# Patient Record
Sex: FEMALE | Race: WHITE | NOT HISPANIC OR LATINO | Employment: FULL TIME | URBAN - METROPOLITAN AREA
[De-identification: names, ages, dates, MRNs, and addresses within clinical notes are randomized per-mention and may not be internally consistent; named-entity substitution may affect disease eponyms.]

---

## 2017-06-20 ENCOUNTER — OFFICE VISIT (OUTPATIENT)
Dept: FAMILY MEDICINE | Facility: CLINIC | Age: 37
End: 2017-06-20
Payer: COMMERCIAL

## 2017-06-20 VITALS
HEIGHT: 65 IN | BODY MASS INDEX: 34.49 KG/M2 | WEIGHT: 207 LBS | SYSTOLIC BLOOD PRESSURE: 118 MMHG | DIASTOLIC BLOOD PRESSURE: 74 MMHG | HEART RATE: 84 BPM | TEMPERATURE: 98.7 F

## 2017-06-20 DIAGNOSIS — E66.9 NON MORBID OBESITY, UNSPECIFIED OBESITY TYPE: ICD-10-CM

## 2017-06-20 DIAGNOSIS — G47.10 EXCESSIVE SLEEPINESS: ICD-10-CM

## 2017-06-20 DIAGNOSIS — Z12.4 SCREENING FOR MALIGNANT NEOPLASM OF CERVIX: ICD-10-CM

## 2017-06-20 DIAGNOSIS — Z00.01 ENCOUNTER FOR ROUTINE ADULT MEDICAL EXAM WITH ABNORMAL FINDINGS: Primary | ICD-10-CM

## 2017-06-20 DIAGNOSIS — R10.9 ABDOMINAL DISCOMFORT: ICD-10-CM

## 2017-06-20 LAB
CHOLEST SERPL-MCNC: 131 MG/DL
DEPRECATED CALCIDIOL+CALCIFEROL SERPL-MC: 22 UG/L (ref 20–75)
ERYTHROCYTE [DISTWIDTH] IN BLOOD BY AUTOMATED COUNT: 12.6 % (ref 10–15)
GLUCOSE SERPL-MCNC: 87 MG/DL (ref 70–99)
HCT VFR BLD AUTO: 41.8 % (ref 35–47)
HDLC SERPL-MCNC: 42 MG/DL
HGB BLD-MCNC: 14.1 G/DL (ref 11.7–15.7)
LDLC SERPL CALC-MCNC: 73 MG/DL
MCH RBC QN AUTO: 28.7 PG (ref 26.5–33)
MCHC RBC AUTO-ENTMCNC: 33.7 G/DL (ref 31.5–36.5)
MCV RBC AUTO: 85 FL (ref 78–100)
NONHDLC SERPL-MCNC: 89 MG/DL
PLATELET # BLD AUTO: 296 10E9/L (ref 150–450)
RBC # BLD AUTO: 4.91 10E12/L (ref 3.8–5.2)
TRIGL SERPL-MCNC: 80 MG/DL
TSH SERPL DL<=0.005 MIU/L-ACNC: 1.67 MU/L (ref 0.4–4)
WBC # BLD AUTO: 10.7 10E9/L (ref 4–11)

## 2017-06-20 PROCEDURE — G0476 HPV COMBO ASSAY CA SCREEN: HCPCS | Performed by: PHYSICIAN ASSISTANT

## 2017-06-20 PROCEDURE — 36415 COLL VENOUS BLD VENIPUNCTURE: CPT | Performed by: PHYSICIAN ASSISTANT

## 2017-06-20 PROCEDURE — 82306 VITAMIN D 25 HYDROXY: CPT | Performed by: PHYSICIAN ASSISTANT

## 2017-06-20 PROCEDURE — 82947 ASSAY GLUCOSE BLOOD QUANT: CPT | Performed by: PHYSICIAN ASSISTANT

## 2017-06-20 PROCEDURE — G0145 SCR C/V CYTO,THINLAYER,RESCR: HCPCS | Performed by: PHYSICIAN ASSISTANT

## 2017-06-20 PROCEDURE — 87624 HPV HI-RISK TYP POOLED RSLT: CPT | Performed by: PHYSICIAN ASSISTANT

## 2017-06-20 PROCEDURE — 99213 OFFICE O/P EST LOW 20 MIN: CPT | Mod: 25 | Performed by: PHYSICIAN ASSISTANT

## 2017-06-20 PROCEDURE — 85027 COMPLETE CBC AUTOMATED: CPT | Performed by: PHYSICIAN ASSISTANT

## 2017-06-20 PROCEDURE — 84443 ASSAY THYROID STIM HORMONE: CPT | Performed by: PHYSICIAN ASSISTANT

## 2017-06-20 PROCEDURE — 80061 LIPID PANEL: CPT | Performed by: PHYSICIAN ASSISTANT

## 2017-06-20 PROCEDURE — 99395 PREV VISIT EST AGE 18-39: CPT | Performed by: PHYSICIAN ASSISTANT

## 2017-06-20 NOTE — PROGRESS NOTES
SUBJECTIVE:     CC: Uma Mattson is an 36 year old woman who presents for preventive health visit.     Healthy Habits:    Do you get at least three servings of calcium containing foods daily (dairy, green leafy vegetables, etc.)? yes    Amount of exercise or daily activities, outside of work: 4-5 day(s) per week    Problems taking medications regularly not applicable    Medication side effects: No    Have you had an eye exam in the past two years? yes    Do you see a dentist twice per year? no    Do you have sleep apnea, excessive snoring or daytime drowsiness?yes, tired    Sleepiness-wakes at 6 am, but feels tired again at 640 am.  Has been working to lose weight.  No change in her weight after 3 months.  Sleeps all night, doesn't snore.  Doesn't take naps during the day.  Is a . Also having a general feeling of fatigue.    Discomfort in lower abdomen, gas. 1-2 years of this.  Loud noises.  R sided pain and discomfort deep inside. Can be across entire abdomen, but mostly on the R.   History of colonoscopy for blood in stool.  Still having thin bowel movements.  Normal colonoscopy.  Can pinpoint any triggers, food, etc.  Periods are normal, no vaginal symptoms. No urinary problems. No heartburn.    -------------------------------------    Today's PHQ-2 Score:   PHQ-2 ( 1999 Pf[employer]) 6/20/2017 4/14/2015   Q1: Little interest or pleasure in doing things 0 0   Q2: Feeling down, depressed or hopeless 0 0   PHQ-2 Score 0 0       Abuse: Current or Past(Physical, Sexual or Emotional)- No  Do you feel safe in your environment - Yes    Social History   Substance Use Topics     Smoking status: Never Smoker     Smokeless tobacco: Never Used     Alcohol use Yes      Comment: a glass or two of wine a month     The patient does not drink >3 drinks per day nor >7 drinks per week.    No results for input(s): CHOL, HDL, LDL, TRIG, CHOLHDLRATIO, NHDL in the last 53587 hours.    Reviewed orders with patient.   "Reviewed health maintenance and updated orders accordingly - Yes    Mammo Decision Support:  Mammogram not appropriate for this patient based on age.    Pertinent mammograms are reviewed under the imaging tab.  History of abnormal Pap smear: NO - age 30-65 PAP every 5 years with negative HPV co-testing recommended    Reviewed and updated as needed this visit by clinical staff  Tobacco  Allergies  Med Hx  Surg Hx  Fam Hx  Soc Hx        Reviewed and updated as needed this visit by Provider            ROS:  C: NEGATIVE for fever, chills, change in weight  I: NEGATIVE for worrisome rashes, moles or lesions  E: NEGATIVE for vision changes or irritation  ENT: NEGATIVE for ear, mouth and throat problems  R: NEGATIVE for significant cough or SOB  B: NEGATIVE for masses, tenderness or discharge  CV: NEGATIVE for chest pain, palpitations or peripheral edema  GI: as above  : NEGATIVE for unusual urinary or vaginal symptoms. Periods are regular.  M: NEGATIVE for significant arthralgias or myalgia  N: NEGATIVE for weakness, dizziness or paresthesias  P: NEGATIVE for changes in mood or affect    Problem list, Medication list, Allergies, and Medical/Social/Surgical histories reviewed in [facility] and updated as appropriate.  OBJECTIVE:     /74 (Cuff Size: Adult Large)  Pulse 84  Temp 98.7  F (37.1  C) (Oral)  Ht 5' 5\" (1.651 m)  Wt 207 lb (93.9 kg)  LMP 06/01/2017 (Approximate)  BMI 34.45 kg/m2  EXAM:  GENERAL: alert, no distress and over weight  EYES: Eyes grossly normal to inspection, PERRL and conjunctivae and sclerae normal  HENT: ear canals and TM's normal, nose and mouth without ulcers or lesions  NECK: no adenopathy, no asymmetry, masses, or scars and thyroid normal to palpation  RESP: lungs clear to auscultation - no rales, rhonchi or wheezes  BREAST: normal without masses, tenderness or nipple discharge and no palpable axillary masses or adenopathy  CV: regular rate and rhythm, normal S1 S2, no S3 or S4, no " murmur, click or rub, no peripheral edema and peripheral pulses strong  ABDOMEN: soft, mild R, and central lower tenderness, without hepatosplenomegaly or masses, and bowel sounds normal, no rebound or guarding.   (female): normal female external genitalia, normal urethral meatus, vaginal mucosa pink, moist, well rugated, and normal cervix/adnexa/uterus without masses or discharge  MS: no gross musculoskeletal defects noted, no edema  SKIN: no suspicious lesions or rashes  NEURO: Normal strength and tone, mentation intact and speech normal  PSYCH: mentation appears normal, affect normal/bright    ASSESSMENT/PLAN:     1. Encounter for routine adult medical exam with abnormal findings  Follow up pending above results. Encouraged healthy diet and regular exercise, monthy SBE, and yearly exams  - Lipid panel reflex to direct LDL  - Glucose    2. Screening for malignant neoplasm of cervix  Has been several years since last pap.  - Pap imaged thin layer screen with HPV - recommended age 30 - 65 years (select HPV order below)  - HPV High Risk Types DNA Cervical    3. Excessive sleepiness  Unclear cause, low concern for TJ but still will consider this in differential.  Below labs in further evaluation.  Recommended continued healthy eating, activity, weight loss.  - TSH with free T4 reflex  - CBC with platelets  - Vitamin D Deficiency    4. Abdominal discomfort  Ongoing for several years, mild, intermittent  Associated gas and bloating.  Has had negative colonoscopy with exception of internal hemorrhoids.  Recommended trial of FODMAP diet.  Referral to GI should this not improve.  - CBC with platelets    5. Non morbid obesity, unspecified obesity type  Recommended healthy diet and lifestyle changes.    COUNSELING:   Reviewed preventive health counseling, as reflected in patient instructions       Regular exercise       Healthy diet/nutrition         reports that she has never smoked. She has never used smokeless  "tobacco.    Estimated body mass index is 34.45 kg/(m^2) as calculated from the following:    Height as of this encounter: 5' 5\" (1.651 m).    Weight as of this encounter: 207 lb (93.9 kg).   Weight management plan: Discussed healthy diet and exercise guidelines and patient will follow up in 12 months in clinic to re-evaluate.    Counseling Resources:  ATP IV Guidelines  Pooled Cohorts Equation Calculator  Breast Cancer Risk Calculator  FRAX Risk Assessment  ICSI Preventive Guidelines  Dietary Guidelines for Americans, 2010  USDA's MyPlate  ASA Prophylaxis  Lung CA Screening    Cande Barker PA-C  [facility]  "

## 2017-06-20 NOTE — NURSING NOTE
"Chief Complaint   Patient presents with     Physical       Initial /74 (Cuff Size: Adult Large)  Pulse 84  Temp 98.7  F (37.1  C) (Oral)  Ht 5' 5\" (1.651 m)  Wt 207 lb (93.9 kg)  LMP 06/01/2017 (Approximate)  BMI 34.45 kg/m2 Estimated body mass index is 34.45 kg/(m^2) as calculated from the following:    Height as of this encounter: 5' 5\" (1.651 m).    Weight as of this encounter: 207 lb (93.9 kg).  Medication Reconciliation: complete   Marly Bustamante, Certified Medical Assistant (AAMA)     "

## 2017-06-20 NOTE — MR AVS SNAPSHOT
After Visit Summary   6/20/2017    Uma Mattson    MRN: [id number]           Patient Information     Date Of Birth          1980        Visit Information        Provider Department      6/20/2017 7:20 AM Cande Barker PA-C [facility]        Today's Diagnoses     Encounter for routine adult medical exam with abnormal findings    -  1    Screening for malignant neoplasm of cervix        Excessive sleepiness        Abdominal discomfort          Care Instructions    Cande or her team will be in touch with you with results of today's labs.  Trial of FODMAP diet. If helps, great! If doesn't, we can have you see Gastroenterology  Call with any questions or concerns.  Continue to focus on a healthy diet and regular exercise.    CELENA Abdalla PA-C    Preventive Health Recommendations  Female Ages 26 - 39  Yearly exam:   See your health care provider every year in order to    Review health changes.     Discuss preventive care.      Review your medicines if you your doctor has prescribed any.    Until age 30: Get a Pap test every three years (more often if you have had an abnormal result).    After age 30: Talk to your doctor about whether you should have a Pap test every 3 years or have a Pap test with HPV screening every 5 years.   You do not need a Pap test if your uterus was removed (hysterectomy) and you have not had cancer.  You should be tested each year for STDs (sexually transmitted diseases), if you're at risk.   Talk to your provider about how often to have your cholesterol checked.  If you are at risk for diabetes, you should have a diabetes test (fasting glucose).  Shots: Get a flu shot each year. Get a tetanus shot every 10 years.   Nutrition:     Eat at least 5 servings of fruits and vegetables each day.    Eat whole-grain bread, whole-wheat pasta and brown rice instead of white grains and rice.    Talk to your provider about Calcium and Vitamin D.      Lifestyle    Exercise at least 150 minutes a week (30 minutes a day, 5 days of the week). This will help you control your weight and prevent disease.    Limit alcohol to one drink per day.    No smoking.     Wear sunscreen to prevent skin cancer.    See your dentist every six months for an exam and cleaning.      Follow up pending above results. Encouraged healthy diet and regular exercise, monthy SBE, and yearly exams      Irritable Bowel Syndrome (IBS) is a very common problem. About 1 in 6 Americans have it, and it is more common in women.  People who have IBS get stomach pain, feel bloated, and have changes in their bowel habits. This can be constipation, diarrhea, or phases of either one.  There is nothing life-threatening or dangerous about IBS, but it can be uncomfortable and difficult to get rid of.  The cause of IBS is not clear, which makes it even harder to treat.    There are a few different things that doctors and patients have tried in order to treat IBS.   If the IBS symptoms are linked to emotions like stress and anxiety, then the symptoms might get better if the emotions are resolved.  Another thing the doctor might try is prescribing antibiotics, like rifaximin.  If the main problem is constipation, then eating more fiber might help.      Some doctors in [place], and now in the [place], are recommending a diet called the  Low FODMAP  diet.  It means a diet low in  Fermentable Oligosaccharides, Disaccharides, Monosaccharides, And Polyols.   These are different types of sugars that are found in food, which the body does not absorb very well.  When they reach the large intestine, the bacteria that live there can ferment them and make gas.  Since this gas can leave people feeling bloated and gassy, cutting out these sugars might make IBS patients feel better.  Also, these sugars can pull water into the gut and cause diarrhea.  Why FODMAP foods cause these symptoms in people with IBS, but not in  everyone, is unclear.    In 2011, there was a study comparing a low FODMAP diet to standard diet advice for patients with IBS. The people on the low FODMAP diet had less bloating, diarrhea, gas, and stomach pain.  They did have to avoid a lot of different foods, though.  Cutting out foods with those FODMAP sugars means you can t have apples, watermelon, high fructose corn syrup, soft cheese, garlic, onions, artificial sweeteners, large portions of wheat-products, or a lot of other foods.      If you are experiencing symptoms of IBS, you may want to try a low FODMAP diet.  It might seem very restricted, but there are still a lot of foods that you can eat.  There are a few good sources online to help get you started, but the FODMAP diet is still new; not every food has been analyzed to measure the FODMAP sugars.  Try cutting out some of the foods known to have a lot of FODMAP sugars, and keep a diary of you symptoms to see if they change.  A few lifestyle changes could spare you a lot of stomach upset and bowel issues.        Here is a list of food to AVOID if you follow the low FODMAP diet:  Fruit: apple, clarissa, pear, canned fruit in fruit juice, watermelon, large servings of fruit, dried, fruit, fruit juices, apple, persimmon, apricot, avocado, blackberry, cherry, lychee, nectarine, peach, pear, plum, prune  Sweeteners: fructose, high fructose corn syrup, honey, sorbitol, mannitol, xylitol, isomalt, maltritol  Milk: milk from cows, goals, sheep, custard, ice cream, yogurt.  Cheeses: soft unripened cheeses (examples-- cottage cheese, cream cheese, ricotta, mascarpone)  Vegetables: artichoke, asparagus, beetroot, broccoli, brussel spouts, cabbage, eggplant, fennel, garlic, tiffany, okra, onion, shallots, spring onion, cauliflower, bell pepper, mushroom, sweet corn  Cereals: wheat and rye in large amounts (crackers, bread, cookies, couscous, pasta)  Legumes: baked beans, chickpeas, kidney beans, lentils, soybeans.    Here  is a list of foods that you CAN eat on a low FODMAP diet:   Fruit: banana, blueberry, boysenberry, cantaloupe, cranberry, grape, grapefruit, honeydew melon, kiwi, lemon, lime, oranges, passion fruit, raspberry, rhubarb, strawberry, tangelo.  Vegetables: alfalfa, bamboo shoots, bean sprouts, bok oumar, carrot, celery, endive, willi, green beans, lettuce, olives, parsnip, potato, pumpkin, red bell pepper, silver beet, spinach, squash, sweet potato, taro, tomato, turnip, yam, zucchini.   Herbs: basil, chili, coriander, willi, lemongrass, marjoram, mint, oregano, parsely, rosemary, thyme  Cereals: gluten free bread of cereal products  Bread: 100% spelt bread  Rice  Oats  Polenta  Other: arrowroot, millet, psyllium, quinoa, sorghum, tapioca  Milk: lactose-free milk, oat milk, rice milk, soy milk  Cheeses: hard cheeses, brie, camembert  Lactose-free yogurt  Ice cream substitutes: gelato, sorbet.  Butter substitutes: olive oil  Tofu  Sweeteners: sugar, cutlre syrup, maple syrup, molasses.     .[facility]   Discharged by : Anay Chavarria MA    Paper scripts provided to patient : no   If you have any questions regarding to your visit please contact your care team:   [facility] Hours Telephone Number   ABHILASH Hemphill Dr., PA-C    7am-7pm Monday - Thursday   7am-5pm Fridays  [phone]   (Appointment scheduling available 24/7)   RN Line   [phone] option 2       What options do I have for visits at the clinic other than the traditional office visit?   To expand how we care for you, many of our providers are utilizing electronic visits (e-visits) and telephone visits, when medically appropriate, for interactions with their patients rather than a visit in the clinic. We also offer nurse visits for many medical concerns. Just like any other service, we will bill your insurance company for this type of visit based on time spent on the phone  with your provider. Not all insurance companies cover these visits. Please check with your medical insurance if this type of visit is covered. You will be responsible for any charges that are not paid by your insurance.     E-visits via [employer]: generally incur a $35.00 fee.     Telephone visits:   Time spent on the phone: *charged based on time that is spent on the phone in increments of 10 minutes. Estimated cost:   5-10 mins $30.00   11-20 mins. $59.00   21-30 mins. $85.00   Use [employer] (secure email communication and access to your chart) to send your primary care provider a message or make an appointment. Ask someone on your Team how to sign up for [employer].   For a Price Quote for your services, please call our [place] Line at [phone].   As always, Thank you for trusting us with your health care needs!                [facility]:    Scheduling Appointments  [facility]  Call: [phone]    [facility]  Call: [phone]    [facility]  Call: [phone]                    Follow-ups after your visit        Who to contact     If you have questions or need follow up information about today's clinic visit or your schedule please contact [facility] directly at [phone].  Normal or non-critical lab and imaging results will be communicated to you by [employer]hart, letter or phone within 4 business days after the clinic has received the results. If you do not hear from us within 7 days, please contact the clinic through [employer]hart or phone. If you have a critical or abnormal lab result, we will notify you by phone as soon as possible.  Submit refill requests through [employer] or call your pharmacy and they will forward the refill request to us. Please allow 3 business days for your refill to be completed.          Additional Information About Your Visit        [employer] Information     [employer] gives  "you secure access to your electronic health record. If you see a primary care provider, you can also send messages to your care team and make appointments. If you have questions, please call your primary care clinic.  If you do not have a primary care provider, please call [phone] and they will assist you.        Care EveryWhere ID     This is your Care EveryWhere ID. This could be used by other organizations to access your [facility] medical records  [id number]        Your Vitals Were     Pulse Temperature Height Last Period BMI (Body Mass Index)       84 98.7  F (37.1  C) (Oral) 5' 5\" (1.651 m) 06/01/2017 (Approximate) 34.45 kg/m2        Blood Pressure from Last 3 Encounters:   06/20/17 118/74   05/05/15 102/64   04/14/15 104/68    Weight from Last 3 Encounters:   06/20/17 207 lb (93.9 kg)   05/05/15 190 lb (86.2 kg)   04/14/15 190 lb (86.2 kg)              We Performed the Following     CBC with platelets     Glucose     HPV High Risk Types DNA Cervical     Lipid panel reflex to direct LDL     Pap imaged thin layer screen with HPV - recommended age 30 - 65 years (select HPV order below)     TSH with free T4 reflex     Vitamin D Deficiency        Primary Care Provider Office Phone # Fax #    Dahiana Lizarraga PA-C [phone]       [address] [address]        Thank you!     Thank you for choosing [facility]  for your care. Our goal is always to provide you with excellent care. Hearing back from our patients is one way we can continue to improve our services. Please take a few minutes to complete the written survey that you may receive in the mail after your visit with us. Thank you!             Your Updated Medication List - Protect others around you: Learn how to safely use, store and throw away your medicines at www.disposemymeds.org.          This list is accurate as of: 6/20/17  8:06 AM.  Always use your most recent med " list.                   Brand Name Dispense Instructions for use    TYLENOL 325 MG tablet   Generic drug:  acetaminophen      Take 325-650 mg by mouth every 6 hours as needed.

## 2017-06-20 NOTE — PATIENT INSTRUCTIONS
Cande or her team will be in touch with you with results of today's labs.  Trial of FODMAP diet. If helps, great! If doesn't, we can have you see Gastroenterology  Call with any questions or concerns.  Continue to focus on a healthy diet and regular exercise.    CELENA Abdalla, PAXochitlC    Preventive Health Recommendations  Female Ages 26 - 39  Yearly exam:   See your health care provider every year in order to    Review health changes.     Discuss preventive care.      Review your medicines if you your doctor has prescribed any.    Until age 30: Get a Pap test every three years (more often if you have had an abnormal result).    After age 30: Talk to your doctor about whether you should have a Pap test every 3 years or have a Pap test with HPV screening every 5 years.   You do not need a Pap test if your uterus was removed (hysterectomy) and you have not had cancer.  You should be tested each year for STDs (sexually transmitted diseases), if you're at risk.   Talk to your provider about how often to have your cholesterol checked.  If you are at risk for diabetes, you should have a diabetes test (fasting glucose).  Shots: Get a flu shot each year. Get a tetanus shot every 10 years.   Nutrition:     Eat at least 5 servings of fruits and vegetables each day.    Eat whole-grain bread, whole-wheat pasta and brown rice instead of white grains and rice.    Talk to your provider about Calcium and Vitamin D.     Lifestyle    Exercise at least 150 minutes a week (30 minutes a day, 5 days of the week). This will help you control your weight and prevent disease.    Limit alcohol to one drink per day.    No smoking.     Wear sunscreen to prevent skin cancer.    See your dentist every six months for an exam and cleaning.      Follow up pending above results. Encouraged healthy diet and regular exercise, monthy SBE, and yearly exams      Irritable Bowel Syndrome (IBS) is a very common problem. About 1 in 6 Americans have it,  and it is more common in women.  People who have IBS get stomach pain, feel bloated, and have changes in their bowel habits. This can be constipation, diarrhea, or phases of either one.  There is nothing life-threatening or dangerous about IBS, but it can be uncomfortable and difficult to get rid of.  The cause of IBS is not clear, which makes it even harder to treat.    There are a few different things that doctors and patients have tried in order to treat IBS.   If the IBS symptoms are linked to emotions like stress and anxiety, then the symptoms might get better if the emotions are resolved.  Another thing the doctor might try is prescribing antibiotics, like rifaximin.  If the main problem is constipation, then eating more fiber might help.      Some doctors in [place], and now in the [place], are recommending a diet called the  Low FODMAP  diet.  It means a diet low in  Fermentable Oligosaccharides, Disaccharides, Monosaccharides, And Polyols.   These are different types of sugars that are found in food, which the body does not absorb very well.  When they reach the large intestine, the bacteria that live there can ferment them and make gas.  Since this gas can leave people feeling bloated and gassy, cutting out these sugars might make IBS patients feel better.  Also, these sugars can pull water into the gut and cause diarrhea.  Why FODMAP foods cause these symptoms in people with IBS, but not in everyone, is unclear.    In 2011, there was a study comparing a low FODMAP diet to standard diet advice for patients with IBS. The people on the low FODMAP diet had less bloating, diarrhea, gas, and stomach pain.  They did have to avoid a lot of different foods, though.  Cutting out foods with those FODMAP sugars means you can t have apples, watermelon, high fructose corn syrup, soft cheese, garlic, onions, artificial sweeteners, large portions of wheat-products, or a lot of other foods.      If you are experiencing  symptoms of IBS, you may want to try a low FODMAP diet.  It might seem very restricted, but there are still a lot of foods that you can eat.  There are a few good sources online to help get you started, but the FODMAP diet is still new; not every food has been analyzed to measure the FODMAP sugars.  Try cutting out some of the foods known to have a lot of FODMAP sugars, and keep a diary of you symptoms to see if they change.  A few lifestyle changes could spare you a lot of stomach upset and bowel issues.        Here is a list of food to AVOID if you follow the low FODMAP diet:  Fruit: apple, clarissa, pear, canned fruit in fruit juice, watermelon, large servings of fruit, dried, fruit, fruit juices, apple, persimmon, apricot, avocado, blackberry, cherry, lychee, nectarine, peach, pear, plum, prune  Sweeteners: fructose, high fructose corn syrup, honey, sorbitol, mannitol, xylitol, isomalt, maltritol  Milk: milk from cows, goals, sheep, custard, ice cream, yogurt.  Cheeses: soft unripened cheeses (examples-- cottage cheese, cream cheese, ricotta, mascarpone)  Vegetables: artichoke, asparagus, beetroot, broccoli, brussel spouts, cabbage, eggplant, fennel, garlic, tiffnay, okra, onion, shallots, spring onion, cauliflower, bell pepper, mushroom, sweet corn  Cereals: wheat and rye in large amounts (crackers, bread, cookies, couscous, pasta)  Legumes: baked beans, chickpeas, kidney beans, lentils, soybeans.    Here is a list of foods that you CAN eat on a low FODMAP diet:   Fruit: banana, blueberry, boysenberry, cantaloupe, cranberry, grape, grapefruit, honeydew melon, kiwi, lemon, lime, oranges, passion fruit, raspberry, rhubarb, strawberry, tangelo.  Vegetables: alfalfa, bamboo shoots, bean sprouts, bok oumar, carrot, celery, endive, willi, green beans, lettuce, olives, parsnip, potato, pumpkin, red bell pepper, silver beet, spinach, squash, sweet potato, taro, tomato, turnip, yam, zucchini.   Herbs: basil, chili, coriander,  willi, lemongrass, marjoram, mint, oregano, parsely, rosemary, thyme  Cereals: gluten free bread of cereal products  Bread: 100% spelt bread  Rice  Oats  Polenta  Other: arrowroot, millet, psyllium, quinoa, sorghum, tapioca  Milk: lactose-free milk, oat milk, rice milk, soy milk  Cheeses: hard cheeses, brie, camembert  Lactose-free yogurt  Ice cream substitutes: gelato, sorbet.  Butter substitutes: olive oil  Tofu  Sweeteners: sugar, cutler syrup, maple syrup, molasses.     .[facility]   Discharged by : Anay Chavarria MA    Paper scripts provided to patient : no   If you have any questions regarding to your visit please contact your care team:   [facility] Hours Telephone Number   ABHILASH Hemphill Dr., PA-C    7am-7pm Monday - Thursday   7am-5pm Fridays  [phone]   (Appointment scheduling available 24/7)   RN Line   [phone] option 2       What options do I have for visits at the clinic other than the traditional office visit?   To expand how we care for you, many of our providers are utilizing electronic visits (e-visits) and telephone visits, when medically appropriate, for interactions with their patients rather than a visit in the clinic. We also offer nurse visits for many medical concerns. Just like any other service, we will bill your insurance company for this type of visit based on time spent on the phone with your provider. Not all insurance companies cover these visits. Please check with your medical insurance if this type of visit is covered. You will be responsible for any charges that are not paid by your insurance.     E-visits via [employer]: generally incur a $35.00 fee.     Telephone visits:   Time spent on the phone: *charged based on time that is spent on the phone in increments of 10 minutes. Estimated cost:   5-10 mins $30.00   11-20 mins. $59.00   21-30 mins. $85.00   Use [employer] (secure email communication  and access to your chart) to send your primary care provider a message or make an appointment. Ask someone on your Team how to sign up for [employer].   For a Price Quote for your services, please call our Consumer Price Line at [phone].   As always, Thank you for trusting us with your health care needs!                [facility]:    Scheduling Appointments  [facility] [place]  Call: [phone]    [facility]  Call: [phone]    [facility]  Call: [phone]

## 2017-06-22 LAB
COPATH REPORT: NORMAL
PAP: NORMAL

## 2017-06-23 LAB
FINAL DIAGNOSIS: NORMAL
HPV HR 12 DNA CVX QL NAA+PROBE: NEGATIVE
HPV16 DNA SPEC QL NAA+PROBE: NEGATIVE
HPV18 DNA SPEC QL NAA+PROBE: NEGATIVE
SPECIMEN DESCRIPTION: NORMAL

## 2019-10-29 ENCOUNTER — MYC MEDICAL ADVICE (OUTPATIENT)
Dept: FAMILY MEDICINE | Facility: CLINIC | Age: 39
End: 2019-10-29

## 2019-11-09 ENCOUNTER — HEALTH MAINTENANCE LETTER (OUTPATIENT)
Age: 39
End: 2019-11-09

## 2020-12-06 ENCOUNTER — HEALTH MAINTENANCE LETTER (OUTPATIENT)
Age: 40
End: 2020-12-06

## 2021-05-28 ENCOUNTER — RECORDS - HEALTHEAST (OUTPATIENT)
Dept: ADMINISTRATIVE | Facility: CLINIC | Age: 41
End: 2021-05-28

## 2021-05-31 ENCOUNTER — RECORDS - HEALTHEAST (OUTPATIENT)
Dept: ADMINISTRATIVE | Facility: CLINIC | Age: 41
End: 2021-05-31

## 2021-09-26 ENCOUNTER — HEALTH MAINTENANCE LETTER (OUTPATIENT)
Age: 41
End: 2021-09-26

## 2022-01-15 ENCOUNTER — HEALTH MAINTENANCE LETTER (OUTPATIENT)
Age: 42
End: 2022-01-15

## 2022-07-16 ENCOUNTER — OFFICE VISIT (OUTPATIENT)
Dept: URGENT CARE | Facility: URGENT CARE | Age: 42
End: 2022-07-16
Payer: COMMERCIAL

## 2022-07-16 VITALS
SYSTOLIC BLOOD PRESSURE: 115 MMHG | BODY MASS INDEX: 34.11 KG/M2 | WEIGHT: 205 LBS | OXYGEN SATURATION: 97 % | HEART RATE: 56 BPM | DIASTOLIC BLOOD PRESSURE: 82 MMHG | TEMPERATURE: 97.3 F

## 2022-07-16 DIAGNOSIS — R07.0 THROAT PAIN: ICD-10-CM

## 2022-07-16 DIAGNOSIS — J03.90 TONSILLITIS: Primary | ICD-10-CM

## 2022-07-16 LAB
DEPRECATED S PYO AG THROAT QL EIA: NEGATIVE
GROUP A STREP BY PCR: NOT DETECTED

## 2022-07-16 PROCEDURE — 99203 OFFICE O/P NEW LOW 30 MIN: CPT | Performed by: NURSE PRACTITIONER

## 2022-07-16 PROCEDURE — 87651 STREP A DNA AMP PROBE: CPT | Performed by: NURSE PRACTITIONER

## 2022-07-16 RX ORDER — CEPHALEXIN 500 MG/1
500 CAPSULE ORAL 2 TIMES DAILY
Qty: 20 CAPSULE | Refills: 0 | Status: SHIPPED | OUTPATIENT
Start: 2022-07-16 | End: 2022-07-26

## 2022-07-16 NOTE — PATIENT INSTRUCTIONS
Tonsillitis  Throat pain x 3 days with erythema and exudate noted on exam. No fever at this time. Strep negative, however based on exam findings will treat with course of antibiotics. Will await culture and notify patient of results. Encourage fluids, rest, warm salt water gargles and tylenol and/or ibuprofen as needed. Return to clinic if symptoms not improving or worsening.   - cephALEXin (KEFLEX) 500 MG capsule; Take 1 capsule (500 mg) by mouth 2 times daily for 10 days    Throat pain  - Streptococcus A Rapid Screen w/Reflex to PCR - Clinic Collect  - Group A Streptococcus PCR Throat Swab

## 2022-07-16 NOTE — PROGRESS NOTES
Assessment & Plan     Tonsillitis  Throat pain x 3 days with erythema and exudate noted on exam. No fever at this time. Strep negative, however based on exam findings will treat with course of antibiotics. Will await culture and notify patient of results. Encourage fluids, rest, warm salt water gargles and tylenol and/or ibuprofen as needed. Return to clinic if symptoms not improving or worsening.   - cephALEXin (KEFLEX) 500 MG capsule; Take 1 capsule (500 mg) by mouth 2 times daily for 10 days    Throat pain  - Streptococcus A Rapid Screen w/Reflex to PCR - Clinic Collect  - Group A Streptococcus PCR Throat Swab             See patient instructions    Return in about 1 week (around 7/23/2022), or if symptoms worsen or fail to improve.    Esther Meade, DNP, APRN-CNP   [facility]    Subjective     Uma Mattson is a 42 year old female who presents today for the following health issues:      HPI    Sore Throat      Duration: 3 days    Description (location/character/radiation): sore throat    Intensity:  moderate    Accompanying signs and symptoms: swollen,     History (similar episodes/previous evaluation): None    Precipitating or alleviating factors: None    Therapies tried and outcome: Tylenol - helpful        Had some sneezing a week ago   Friday night a little ear pressure         Review of Systems  Constitutional, HEENT, cardiovascular, pulmonary, gi and gu systems are negative, except as otherwise noted.    Objective   /82   Pulse 56   Temp 97.3  F (36.3  C) (Tympanic)   Wt 93 kg (205 lb)   SpO2 97%   BMI 34.11 kg/m    Body mass index is 34.11 kg/m .    Physical Exam  GENERAL APPEARANCE: healthy, alert and no distress  HENT: ear canals and TM's normal, nose without ulcers or lesions, and tonsillar erythema and tonsillar exudate  NECK: no adenopathy, no asymmetry, masses, or scars and thyroid normal to palpation  RESP: lungs clear to auscultation - no rales, rhonchi  or wheezes  CV: regular rates and rhythm, normal S1 S2, no S3 or S4 and no murmur, click or rub  ABDOMEN: soft, nontender, without hepatosplenomegaly or masses and bowel sounds normal  SKIN: no suspicious lesions or rashes  PSYCH: mentation appears normal and affect normal/bright    Diagnostic Test Results:  Results for orders placed or performed in visit on 07/16/22 (from the past 24 hour(s))   Streptococcus A Rapid Screen w/Reflex to PCR - Clinic Collect    Specimen: Throat; Swab   Result Value Ref Range    Group A Strep antigen Negative Negative            Chart documentation with Dragon Voice recognition Software. Although reviewed after completion, some words and grammatical errors may remain.

## 2023-04-23 ENCOUNTER — HEALTH MAINTENANCE LETTER (OUTPATIENT)
Age: 43
End: 2023-04-23

## 2024-02-10 ENCOUNTER — HEALTH MAINTENANCE LETTER (OUTPATIENT)
Age: 44
End: 2024-02-10

## 2024-06-29 ENCOUNTER — HEALTH MAINTENANCE LETTER (OUTPATIENT)
Age: 44
End: 2024-06-29